# Patient Record
Sex: FEMALE | Race: WHITE | Employment: FULL TIME | URBAN - METROPOLITAN AREA
[De-identification: names, ages, dates, MRNs, and addresses within clinical notes are randomized per-mention and may not be internally consistent; named-entity substitution may affect disease eponyms.]

---

## 2018-11-12 ENCOUNTER — HOSPITAL ENCOUNTER (OUTPATIENT)
Dept: PHYSICAL THERAPY | Age: 38
Discharge: HOME | End: 2018-11-12
Payer: COMMERCIAL

## 2018-11-12 PROCEDURE — 97162 PT EVAL MOD COMPLEX 30 MIN: CPT

## 2018-11-12 PROCEDURE — 97110 THERAPEUTIC EXERCISES: CPT

## 2018-11-12 NOTE — PROGRESS NOTES
PHYSICAL THERAPY - DAILY TREATMENT NOTE Patient Name: Wenceslao Lin        Date: 2018 : 1980   YES Patient  Verified Visit #:     Insurance: Payor: Tonio Banuelos / Plan: [facility] PPO / Product Type: Commerical / In time: 335 Out time: 430 Total Treatment Time: 54 Medicare/BCBS Time Tracking (below) Total Timed Codes (min):   1:1 Treatment Time:    
TREATMENT AREA =  Right ankle pain [M25.571] SUBJECTIVE Pain Level (on 0 to 10 scale):  4  / 10 Medication Changes/New allergies or changes in medical history, any new surgeries or procedures? NO    If yes, update Summary List  
Subjective Functional Status/Changes:  []  No changes reported See POC OBJECTIVE Modalities Rationale:     decrease inflammation, decrease pain and increase tissue extensibility to improve patient's ability to perform functional ADLs 
 min [] Estim, type/location:   
                                 []  att     []  unatt     []  w/US     []  w/ice    []  w/heat 
 min []  Mechanical Traction: type/lbs   
               []  pro   []  sup   []  int   []  cont    []  before manual    []  after manual  
 min []  Ultrasound, settings/location:    
 min []  Iontophoresis w/ dexamethasone, location:   
                                           []  take home patch       []  in clinic  
 min []  Ice     []  Heat    location/position:   
 min []  Vasopneumatic Device, press/temp:   
 min []  Other:   
[] Skin assessment post-treatment (if applicable):   
[]  intact    []  redness- no adverse reaction    
[]redness  adverse reaction:     
15 min Therapeutic Exercise:  [x]  See flow sheet Rationale:      increase ROM and increase strength to improve the patients ability to regain full functional mobility of R ankle for return to walking 
  
 min Manual Therapy: Technique:     
[] S/DTM []IASTM []PROM [] Passive Stretching []manual TPR  [] TDN (see objective; actual needle insertion time not billed) []Jt manipulation:Gr I [] II []  III [] IV[] V[] Treatment/Area:    
Rationale:      decrease pain, increase ROM, increase tissue extensibility and decrease trigger points to improve patient's ability to  min Therapeutic Activity:   
Rationale:    increase strength, improve coordination and increase proprioception to improve the patients ability to  
 min Neuromuscular Re-ed:   
Rationale:    improve coordination, improve balance and increase proprioception to improve the patients ability to  
 min Gait Training:   
Rationale:     min Patient Education:  YES  Reviewed HEP [x]  Progressed/Changed HEP based on:   Issued written HEP and reviewed Other Objective/Functional Measures: 
 
See POC 
TE per FS Post Treatment Pain Level (on 0 to 10) scale:   4  / 10 ASSESSMENT Assessment/Changes in Function:  
 
Progressed treatment as appropriate with good patient tolerance 
  
[]  See Progress Note/Recertification Patient will continue to benefit from skilled PT services to modify and progress therapeutic interventions, address functional mobility deficits, address ROM deficits, address strength deficits, analyze and address soft tissue restrictions, analyze and cue movement patterns, analyze and modify body mechanics/ergonomics and assess and modify postural abnormalities to attain remaining goals. Progress toward goals / Updated goals: 
Progressing towards goals established in POC PLAN [x]  Upgrade activities as tolerated YES Continue plan of care  
[]  Discharge due to :   
[]  Other:   
 
Therapist: Leon Fitzpatrick PT, DPT, MTC, CMTPT Date: 11/12/2018 Time: 6:45 PM  
 
Future Appointments Date Time Provider Christian Carver 11/16/2018 11:30 AM Aquilino Dumont PT [facility]  
11/20/2018  9:00 AM Aquilino Dumont PT [facility]  
11/23/2018 11:00 AM [facility]  
 11/27/2018  9:00 AM Danilo Cantu PT [place] [id number]  
11/30/2018 10:00 AM Belkys Lacey [place] [address]  
12/4/2018  9:30 AM Danilo Cantu, PT [place] [address]  
12/7/2018 10:00 AM Lorjoe Lacey [place] [address]  
12/11/2018  9:30 AM Danilo Cantu PT [place] [id number]  
12/14/2018 10:00 AM Belkys Lacey [place] [address]  
12/18/2018  9:30 AM Danilo Cantu PT [place] [address]  
12/21/2018 10:00 AM Belkys Lacey [place] [address]  
12/26/2018  2:30 PM Danilo Cantu, PT [place] [address]  
12/28/2018  9:30 AM Danilo Cantu PT [place] [address]  
1/2/2019  9:30 AM Danilo Cantu PT [place] [address]  
1/4/2019  9:30 AM Danilo Cantu PT [place] [address]  
1/8/2019  9:30 AM Danilo Cantu PT [place] [address]  
1/11/2019 10:00 AM Laly Kumar [facility] [address]

## 2018-11-12 NOTE — PROGRESS NOTES
Bethany Metz [address] [facility] [place] [facility]  [facility] 
[facility] [place] [facility], [address] ,[address], [address]  Phone: [phone]  Fax: [phone] PLAN OF CARE / STATEMENT OF MEDICAL NECESSITY FOR PHYSICAL THERAPY SERVICES Patient Name: Ene Singh : 1980 Medical  
Diagnosis: Right ankle pain [M25.571] Treatment Diagnosis: R ankle pain Onset Date: 18 Referral Source: Sara Connors MD [facility]): 2018 Prior Hospitalization: See medical history Provider #: [id number] Prior Level of Function: Rock climbing Comorbidities: RA, R tib/fib non-union FX Medications: Verified on Patient Summary List  
The Plan of Care and following information is based on the information from the initial evaluation.  
=========================================================================================== Assessment / key information: Patient is a 40 y.o. female who presents to [facility] S/P R post tib T repair on 18. Patient reports she was rocking climbing on 9/15/17 and she jumped down onto pad from [address] [place] [address] Place, resulting in R tib/fib FX. She has emergency SX for FX reduction and placement of external fixator, and ORIF 10/2/17. She developed infection along medial lower leg which was irrigated in 2017, tibial hardware removed 2017 and received pic line antibiotics. She received bone marrow inj due to non-union FX, cleared to WB in limited hinged AFO March-2018. Pain persisted and fibula hardware was removed on 2018 in addition to repair of post tib T longitudinal tear. Currently she has been WBAT in hinged AFO for 1 week using FWW. Objective PT examination findings include (1) dec R ankle AROM DF -4°, PF 40°, INV 10° p!, EV 12°, GT ext 28°(2) swelling (3) strength grossly 3+/5, PF/INV TBA (4) WB tolerance approx 50%.  A home exercise program was demonstrated and provided to address the above objective and functional deficits. Patient can benefit from skilled PT interventions to improve ROM/strenght, decrease swelling, to facilitate performance of ADLs & improve overall functional status. =========================================================================================== Eval Complexity: History MEDIUM  Complexity : 1-2 comorbidities / personal factors will impact the outcome/ POC ;  Examination  MEDIUM Complexity : 3 Standardized tests and measures addressing body structure, function, activity limitation and / or participation in recreation ; Presentation MEDIUM Complexity : Evolving with changing characteristics ; Decision Making MEDIUM Complexity : FOTO score of 26-74; Overall Complexity MEDIUM Problem List: pain affecting function, decrease ROM, decrease strength, edema affecting function, impaired gait/ balance, decrease ADL/ functional abilitiies, decrease activity tolerance, decrease flexibility/ joint mobility and decrease transfer abilities, FOTO score 32 Treatment Plan may include any combination of the following: Therapeutic exercise, Therapeutic activities, Neuromuscular re-education, Physical agent/modality, Gait/balance training, Manual therapy including dry needling, Aquatic therapy and Patient education Patient / Family readiness to learn indicated by: asking questions, trying to perform skills and interestPersons(s) to be included in education: patient (P) Barriers to Learning/Limitations: no 
Measures taken:   
Patient Goal (s):\"re-establish some level of normalcy: flexibility & full weightbearing\" Patient self reported health status: fair Rehabilitation Potential: good? Short Term Goals: To be accomplished in  1-2  weeks: 
1) Patient to be independent and compliant with initial HEP to prevent further disability. 2) Improve R ankle mobility by >5° in all planes so ROM is available for normal gait mechanics. 3) Patient to maintain SLS for >10sec with good ankle balance strategy indicating improved strength and WBing tolerance to walking without AD ? Long Term Goals: To be accomplished in  3-4  weeks: 
1) Patient to be independent & compliant with a progressive, high level HEP in order to maintain gains made in physical therapy. 2) Improve FOTO score to 50 indicating significant functional improvement in order to return to PLOF. 3) Improve gross R ankle strength to 4+/5 MMT so strength is available for ADLs. Frequency / Duration:   Patient to be seen  2-3  times per week for 3-4  weeks: 
Patient / Caregiver education and instruction: self care, activity modification and exercises G-Codes (GP): NA Therapist Signature: Pierce Kuo PT, DPT, MTC, CMTPT Date: 11/12/2018 Certification Period: NA Time: 3:39 PM  
=========================================================================================== I certify that the above Physical Therapy Services are being furnished while the patient is under my care. I agree with the treatment plan and certify that this therapy is necessary. Physician Signature:       Date:      Time:  Please sign and return to [facility] [place] or you may fax the signed copy to [phone]. Thank you.

## 2018-11-16 ENCOUNTER — HOSPITAL ENCOUNTER (OUTPATIENT)
Dept: PHYSICAL THERAPY | Age: 38
Discharge: HOME | End: 2018-11-16
Payer: COMMERCIAL

## 2018-11-16 PROCEDURE — 97110 THERAPEUTIC EXERCISES: CPT

## 2018-11-16 NOTE — PROGRESS NOTES
PHYSICAL THERAPY - DAILY TREATMENT NOTE Patient Name: Ivonne Awad        Date: 2018 : 1980   YES Patient  Verified Visit #:      of   12  Insurance: Payor: Radha Domingo / Plan: [facility] PPO / Product Type: Commerical / In time: 1130 Out time: 1220 Total Treatment Time: 45 Medicare/BCBS Time Tracking (below) Total Timed Codes (min):   1:1 Treatment Time:    
TREATMENT AREA =  Right ankle pain [M25.571] SUBJECTIVE Pain Level (on 0 to 10 scale):  4  / 10 Medication Changes/New allergies or changes in medical history, any new surgeries or procedures? NO    If yes, update Summary List  
Subjective Functional Status/Changes:  []  No changes reported Amarilis Buff been doing the HEP and have noticed my calf is starting to loosen up OBJECTIVE Modalities Rationale:     decrease inflammation, decrease pain and increase tissue extensibility to improve patient's ability to perform functional ADLs 
 min [] Estim, type/location:   
                                 []  att     []  unatt     []  w/US     []  w/ice    []  w/heat 
 min []  Mechanical Traction: type/lbs   
               []  pro   []  sup   []  int   []  cont    []  before manual    []  after manual  
 min []  Ultrasound, settings/location:    
 min []  Iontophoresis w/ dexamethasone, location:   
                                           []  take home patch       []  in clinic  
@ home min []  Ice     []  Heat    location/position:   
 min []  Vasopneumatic Device, press/temp:   
 min []  Other:   
[] Skin assessment post-treatment (if applicable):   
[]  intact    []  redness- no adverse reaction    
[]redness  adverse reaction:     
45 min Therapeutic Exercise:  [x]  See flow sheet Rationale:      increase ROM and increase strength to improve the patients ability to regain full functional mobility of R ankle for return to walking 
  
 min Manual Therapy: Technique: [] S/DTM []IASTM []PROM [] Passive Stretching  
[]manual TPR  [] TDN (see objective; actual needle insertion time not billed) []Jt manipulation:Gr I [] II []  III [] IV[] V[] Treatment/Area:    
Rationale:      decrease pain, increase ROM, increase tissue extensibility and decrease trigger points to improve patient's ability to  min Therapeutic Activity:   
Rationale:    increase strength, improve coordination and increase proprioception to improve the patients ability to  
 min Neuromuscular Re-ed:   
Rationale:    improve coordination, improve balance and increase proprioception to improve the patients ability to  
 min Gait Training:   
Rationale:     min Patient Education:  YES  Reviewed HEP [x]  Progressed/Changed HEP based on:   Issued written HEP and reviewed Other Objective/Functional Measures: 
 
TE per FS Added R BAPS/RB in stting, tband ankle 4way and tband FHL, WS Post Treatment Pain Level (on 0 to 10) scale:   3  / 10 ASSESSMENT Assessment/Changes in Function:  
 
Progressed treatment as appropriate with good patient tolerance. Difficulty with ecc control during tband exercises 
  
[]  See Progress Note/Recertification Patient will continue to benefit from skilled PT services to modify and progress therapeutic interventions, address functional mobility deficits, address ROM deficits, address strength deficits, analyze and address soft tissue restrictions, analyze and cue movement patterns, analyze and modify body mechanics/ergonomics and assess and modify postural abnormalities to attain remaining goals. Progress toward goals / Updated goals: 
Progressing towards STG1 PLAN [x]  Upgrade activities as tolerated YES Continue plan of care  
[]  Discharge due to :   
[]  Other:   
 
Therapist: Owen Cole, PT, DPT, MTC, CMTPT Date: 11/16/2018 Time: 6:45 PM  
 
Future Appointments Date Time Provider Christian Carver 11/20/2018  9:00 AM Danilo Cantu, PT [facility]  
 11/23/2018 11:00 AM [facility]  
11/27/2018  9:00 AM [place]amini Ovalles, [place] [facility]  
11/30/2018 10:00 AM [facility]  
12/4/2018  9:30 AM Yamini Ovalles, [place] [facility]  
12/7/2018 10:00 AM [facility]  
12/11/2018  9:30 AM Yamini Ovalles, PT [facility]  
12/14/2018 10:00 AM [facility]  
12/18/2018  9:30 AM Yamini Ovalles, PT [facility]  
12/21/2018 10:00 AM [facility]  
12/26/2018  2:30 PM Yamini Ovalles, PT [facility]  
12/28/2018  9:30 AM Yamini Ovalles, PT [facility]  
1/2/2019  9:30 AM Yamini Ovalles, PT [facility]  
1/4/2019  9:30 AM Yamini Ovalles, PT [facility]  
1/8/2019  9:30 AM Yamini Ovalles, PT [facility]  
1/11/2019 10:00 AM [facility]

## 2018-11-20 ENCOUNTER — HOSPITAL ENCOUNTER (OUTPATIENT)
Dept: PHYSICAL THERAPY | Age: 38
Discharge: HOME | End: 2018-11-20
Payer: COMMERCIAL

## 2018-11-20 PROCEDURE — 97110 THERAPEUTIC EXERCISES: CPT

## 2018-11-20 NOTE — PROGRESS NOTES
PHYSICAL THERAPY - DAILY TREATMENT NOTE Patient Name: Eliza Huynh        Date: 2018 : 1980   YES Patient  Verified Visit #:   3   of   12  Insurance: Payor: Prduencio Lenz / Plan: [employer] [facility] [employer] PPO / Product Type: Commerical / In time: 900 Out time: 1000 Total Treatment Time: 54 Medicare/BCBS Time Tracking (below) Total Timed Codes (min):   1:1 Treatment Time:    
TREATMENT AREA =  Right ankle pain [M25.571] SUBJECTIVE Pain Level (on 0 to 10 scale):  3  / 10 Medication Changes/New allergies or changes in medical history, any new surgeries or procedures? NO    If yes, update Summary List  
Subjective Functional Status/Changes:  []  No changes reported A little sore in the mm after LV but no more than I expected and evan been doing the exercises OBJECTIVE Modalities Rationale:     decrease inflammation, decrease pain and increase tissue extensibility to improve patient's ability to perform functional ADLs 
 min [] Estim, type/location:   
                                 []  att     []  unatt     []  w/US     []  w/ice    []  w/heat 
 min []  Mechanical Traction: type/lbs   
               []  pro   []  sup   []  int   []  cont    []  before manual    []  after manual  
 min []  Ultrasound, settings/location:    
 min []  Iontophoresis w/ dexamethasone, location:   
                                           []  take home patch       []  in clinic  
10 min [x]  Ice     []  Heat    location/position: R ankle, LE elevated  
 min []  Vasopneumatic Device, press/temp:   
 min []  Other:   
[] Skin assessment post-treatment (if applicable):   
[]  intact    []  redness- no adverse reaction    
[]redness  adverse reaction:     
45 min Therapeutic Exercise:  [x]  See flow sheet Rationale:      increase ROM and increase strength to improve the patients ability to regain full functional mobility of R ankle for return to walking min Manual Therapy: Technique:     
[] S/DTM []IASTM []PROM [] Passive Stretching  
[]manual TPR  [] TDN (see objective; actual needle insertion time not billed) []Jt manipulation:Gr I [] II []  III [] IV[] V[] Treatment/Area:    
Rationale:      decrease pain, increase ROM, increase tissue extensibility and decrease trigger points to improve patient's ability to  min Therapeutic Activity:   
Rationale:    increase strength, improve coordination and increase proprioception to improve the patients ability to  
 min Neuromuscular Re-ed:   
Rationale:    improve coordination, improve balance and increase proprioception to improve the patients ability to  
 min Gait Training:   
Rationale:     min Patient Education:  YES  Reviewed HEP [x]  Progressed/Changed HEP based on:   Issued written HEP and reviewed Other Objective/Functional Measures: 
 
TE per FS Added soleus stretch, tband post tib, std tandem and std HR Post Treatment Pain Level (on 0 to 10) scale:   3  / 10 ASSESSMENT Assessment/Changes in Function:  
 
Improving tolerance to FWB on R without brace, tolerance remains limited by nerve pain/hypersensitivity []  See Progress Note/Recertification Patient will continue to benefit from skilled PT services to modify and progress therapeutic interventions, address functional mobility deficits, address ROM deficits, address strength deficits, analyze and address soft tissue restrictions, analyze and cue movement patterns, analyze and modify body mechanics/ergonomics and assess and modify postural abnormalities to attain remaining goals. Progress toward goals / Updated goals: 
Progressing towards STG1, see above PLAN [x]  Upgrade activities as tolerated YES Continue plan of care  
[]  Discharge due to :   
[]  Other:   
 
Therapist: Ariel Gimenez, PT, DPT, MTC, CMTPT Date: 11/20/2018 Time: 6:45 PM  
 
Future Appointments Date Time Provider Christian Carver [facility]  
11/27/2018  9:00 AM Basilia Louis, PT [facility]  
11/30/2018 10:[facility]  
12/4/2018  9:30 AM Basilia Louis, PT [facility]  
12/7/2018 10:[facility]  
12/11/2018  9:30 AM Basilia Louis, PT [facility]  
[facility]  
12/18/2018  9:30 AM Basilia Louis, PT [facility]  
12/21/2018 10:00 AM [facility]  
12/26/2018  2:30 PM Basilia Louis PT [facility]  
12/28/2018  9:30 AM Basilia Louis, PT [facility]  
1/2/2019  9:30 AM Basilia Louis PT [facility]  
1/4/2019  9:30 AM Basilia Louis PT [facility]  
1/8/2019  9:30 AM Basilia Louis PT [facility]  
1/11/2019 10:00 AM Ava Myrick [facility]

## 2018-11-23 ENCOUNTER — HOSPITAL ENCOUNTER (OUTPATIENT)
Dept: PHYSICAL THERAPY | Age: 38
Discharge: HOME | End: 2018-11-23
Payer: COMMERCIAL

## 2018-11-23 PROCEDURE — 97110 THERAPEUTIC EXERCISES: CPT

## 2018-11-23 NOTE — PROGRESS NOTES
PHYSICAL THERAPY - DAILY TREATMENT NOTE Patient Name: Amanda Avendano        Date: 2018 : 1980   yes Patient  Verified Visit #:     Insurance: Payor: Lane Scott / Plan: [facility] PPO / Product Type: Commerical / In time: 11:00A Out time: 12:10P Total Treatment Time: 70 Medicare/ [facility] Time Tracking (below) Total Timed Codes (min):  NA 1:1 Treatment Time:  NA  
TREATMENT AREA =  Right ankle pain [M25.571] SUBJECTIVE Pain Level (on 0 to 10 scale):  3  / 10 Medication Changes/New allergies or changes in medical history, any new surgeries or procedures?    no  If yes, update Summary List  
Subjective Functional Status/Changes:  []  No changes reported \"I am very sore today, I did a lot of standing and moving around for thanksgiving my foot is even more swollen than it normally is. \" OBJECTIVE Modalities Rationale:     decrease edema, decrease inflammation and decrease pain to improve patient's ability to perform unlimited ambulation with AFO donned. min [] Estim, type/location:   
                                 []  att     []  unatt     []  w/US     []  w/ice    []  w/heat 
 min []  Mechanical Traction: type/lbs   
               []  pro   []  sup   []  int   []  cont    []  before manual    []  after manual  
 min []  Ultrasound, settings/location:    
 min []  Iontophoresis w/ dexamethasone, location:   
                                           []  take home patch       []  in clinic  
10 min [x]  Ice     []  Heat    location/position: R foot and ankle in elevation   
 min []  Vasopneumatic Device, press/temp:   
 min []  Other:   
[] Skin assessment post-treatment (if applicable):   
[]  intact    []  redness- no adverse reaction    
[]redness  adverse reaction:     
60 min Therapeutic Exercise:  [x]  See flow sheet Rationale:      increase ROM, increase strength, improve coordination, improve balance and increase proprioception to improve the patients ability to perform unlimited ADLs  
 min Patient Education:  yes  Reviewed HEP []  Progressed/Changed HEP based on: Other Objective/Functional Measures: 
 
Inc repetitions of RB and [employer] standing time for tandem balance Post Treatment Pain Level (on 0 to 10) scale:   3  / 10 ASSESSMENT Assessment/Changes in Function:  
 
Patient is making good progress with PT rx with improvements in eccentric control of the R ankle, she has quick fatigue during all standing therex with poor stability in tandem with R foot back. []  See Progress Note/Recertification Patient will continue to benefit from skilled PT services to modify and progress therapeutic interventions, address functional mobility deficits, address ROM deficits, address strength deficits, analyze and address soft tissue restrictions, analyze and cue movement patterns, analyze and modify body mechanics/ergonomics and address imbalance/dizziness to attain remaining goals. Progress toward goals / Updated goals: 
Progressing towards STG 2. PLAN [x]  Upgrade activities as tolerated yes Continue plan of care  
[]  Discharge due to :   
[]  Other:   
 
Therapist: Carolyne Paget PT, DPT Date: 11/23/2018 Time: 12:43 PM  
 
Future Appointments Date Time Provider Christian Carver 11/27/2018  9:00 AM Darcie Luther PT [facility]  
11/30/2018 10:00 AM [facility]  
12/4/2018  9:30 AM Darcie Luther PT [facility]  
12/7/2018 10:00 AM [facility]  
12/11/2018  9:30 AM Darcie Luther PT [facility]  
12/14/2018 10:00 AM [facility]  
12/18/2018  9:30 AM Darcie Luther PT [facility]  
12/21/2018 [facility]  
12/26/2018  2:30 PM Darcie Luther PT [facility]  
12/28/2018  9:30 AM Darcie Luther PT [facility]  
1/2/2019  9:30 AM Darcie Luther PT [facility]  
 1/4/2019  9:30 AM Marco A Mckeon, PT [facility]  
1/8/2019  9:30 AM Marco A Mckeon PT [facility]  
1/11/2019 10:00 AM [facility]

## 2018-11-27 ENCOUNTER — HOSPITAL ENCOUNTER (OUTPATIENT)
Dept: PHYSICAL THERAPY | Age: 38
Discharge: HOME | End: 2018-11-27
Payer: COMMERCIAL

## 2018-11-27 PROCEDURE — 97110 THERAPEUTIC EXERCISES: CPT

## 2018-11-27 NOTE — PROGRESS NOTES
PHYSICAL THERAPY - DAILY TREATMENT NOTE Patient Name: Angeline Piper        Date: 2018 : 1980   YES Patient  Verified Visit #:     Insurance: Payor: Anuja Knight / Plan: [facility] PPO / Product Type: Commerical / In time: 900 Out time: 1005 Total Treatment Time: 72 Medicare/[facility] Time Tracking (below) Total Timed Codes (min):   1:1 Treatment Time:    
TREATMENT AREA =  Right ankle pain [M25.571] SUBJECTIVE Pain Level (on 0 to 10 scale):  5  / 10 Medication Changes/New allergies or changes in medical history, any new surgeries or procedures? NO    If yes, update Summary List  
Subjective Functional Status/Changes:  []  No changes reported I have a lot of popping in my ankle at the first part of the HR but it doesn't really hurt OBJECTIVE Modalities Rationale:     decrease inflammation, decrease pain and increase tissue extensibility to improve patient's ability to perform functional ADLs 
 min [] Estim, type/location:   
                                 []  att     []  unatt     []  w/US     []  w/ice    []  w/heat 
 min []  Mechanical Traction: type/lbs   
               []  pro   []  sup   []  int   []  cont    []  before manual    []  after manual  
 min []  Ultrasound, settings/location:    
 min []  Iontophoresis w/ dexamethasone, location:   
                                           []  take home patch       []  in clinic  
10 min [x]  Ice     []  Heat    location/position: R ankle, LE elevated  
 min []  Vasopneumatic Device, press/temp:   
 min []  Other:   
[] Skin assessment post-treatment (if applicable):   
[]  intact    []  redness- no adverse reaction    
[]redness  adverse reaction:     
55 min Therapeutic Exercise:  [x]  See flow sheet Rationale:      increase ROM and increase strength to improve the patients ability to regain full functional mobility of R ankle for return to walking min Manual Therapy: Technique:     
[] S/DTM []IASTM []PROM [] Passive Stretching  
[]manual TPR  [] TDN (see objective; actual needle insertion time not billed) []Jt manipulation:Gr I [] II []  III [] IV[] V[] Treatment/Area:    
Rationale:      decrease pain, increase ROM, increase tissue extensibility and decrease trigger points to improve patient's ability to  min Therapeutic Activity:   
Rationale:    increase strength, improve coordination and increase proprioception to improve the patients ability to  
 min Neuromuscular Re-ed:   
Rationale:    improve coordination, improve balance and increase proprioception to improve the patients ability to  
 min Gait Training:   
Rationale:     min Patient Education:  YES  Reviewed HEP [x]  Progressed/Changed HEP based on:   Issued written HEP and reviewed Other Objective/Functional Measures: 
 
TE per FS Progressed gastroc/soleus stretch and RB ant/post in to [address], added seated post tib HR with ball between knees and G tband at ankles, stair negotiation without brace Post Treatment Pain Level (on 0 to 10) scale:   3  / 10 ASSESSMENT Assessment/Changes in Function:  
 
Improving ankle stability during both static and dynamic mvmts 
  
[]  See Progress Note/Recertification Patient will continue to benefit from skilled PT services to modify and progress therapeutic interventions, address functional mobility deficits, address ROM deficits, address strength deficits, analyze and address soft tissue restrictions, analyze and cue movement patterns, analyze and modify body mechanics/ergonomics and assess and modify postural abnormalities to attain remaining goals. Progress toward goals / Updated goals: 
Met STG1 PLAN [x]  Upgrade activities as tolerated YES Continue plan of care  
[]  Discharge due to :   
[]  Other:   
 
Therapist: Vik Chandler, PT, DPT, MTC, CMTPT Date: 11/27/2018 Time: 6:45 PM  
 
Future Appointments Date Time Provider Christian Bei 11/30/2018 10:00 AM [facility]  
12/4/2018  9:30 AM Jasper Exon, PT [facility]  
12/7/2018 10:00 AM [facility]  
12/11/2018  9:30 AM Jasper Exon, PT [facility]  
12/14/2018 10:00 AM [facility]  
12/18/2018  9:30 AM Jasper Exon, PT [facility]  
12/21/2018 10:00 AM [facility]  
12/26/2018  2:30 PM Jasper Exon, PT [facility]  
12/28/2018  9:30 AM Jasper Exon, PT [facility]  
1/2/2019  9:30 AM Jasper Exon, PT [facility]  
1/4/2019  9:30 AM Jasper Exon, PT [facility]  
1/8/2019  9:30 AM Jasper Exon, PT [facility]  
1/11/2019 10:00 AM [facility]

## 2018-11-30 ENCOUNTER — HOSPITAL ENCOUNTER (OUTPATIENT)
Dept: PHYSICAL THERAPY | Age: 38
Discharge: HOME | End: 2018-11-30
Payer: COMMERCIAL

## 2018-11-30 PROCEDURE — 97110 THERAPEUTIC EXERCISES: CPT

## 2018-11-30 NOTE — PROGRESS NOTES
PHYSICAL THERAPY - DAILY TREATMENT NOTE Patient Name: Wenceslao Lin        Date: 2018 : 1980   yes Patient  Verified Visit #:     Insurance: Payor: Tonio Banuelos / Plan: [facility] PPO / Product Type: Commerical / In time: 10:00A Out time: 11:05A Total Treatment Time: 72 Medicare/ [employer] Time Tracking (below) Total Timed Codes (min):  NA 1:1 Treatment Time:  NA  
TREATMENT AREA =  Right ankle pain [M25.571] SUBJECTIVE Pain Level (on 0 to 10 scale):  4  / 10 Medication Changes/New allergies or changes in medical history, any new surgeries or procedures?    no  If yes, update Summary List  
Subjective Functional Status/Changes:  []  No changes reported \"I am doing pretty good. My swelling was better this morning when I wok eup. I wore the compression sock throughout the night and that really helped. \" OBJECTIVE Modalities Rationale:     decrease inflammation and decrease pain to improve patient's ability to perform unlimited ADLS 
 min [] Estim, type/location:   
                                 []  att     []  unatt     []  w/US     []  w/ice    []  w/heat 
 min []  Mechanical Traction: type/lbs   
               []  pro   []  sup   []  int   []  cont    []  before manual    []  after manual  
 min []  Ultrasound, settings/location:    
 min []  Iontophoresis w/ dexamethasone, location:   
                                           []  take home patch       []  in clinic  
10 min [x]  Ice     []  Heat    location/position: R ankle in supine with elevation  
 min []  Vasopneumatic Device, press/temp:   
 min []  Other:   
[] Skin assessment post-treatment (if applicable):   
[]  intact    []  redness- no adverse reaction    
[]redness  adverse reaction:     
55 min Therapeutic Exercise:  [x]  See flow sheet Rationale:      increase ROM, increase strength, improve coordination, improve balance and increase proprioception to improve the patients ability to perform unlimited ADls   
 min Patient Education:  yes  Reviewed HEP []  Progressed/Changed HEP based on: Other Objective/Functional Measures: Added standing mini squats with focus on even weight distribution on BLE Performed lateral 4\" step downs Initiated rec bike. Post Treatment Pain Level (on 0 to 10) scale:   0  / 10 ASSESSMENT Assessment/Changes in Function:  
 
Patient is making good progress with PT rx with improvements in gait mechanics as well as single limb stability on the RLE. []  See Progress Note/Recertification Patient will continue to benefit from skilled PT services to modify and progress therapeutic interventions, address functional mobility deficits, address ROM deficits, address strength deficits, analyze and address soft tissue restrictions, analyze and cue movement patterns, analyze and modify body mechanics/ergonomics, assess and modify postural abnormalities, address imbalance/dizziness and instruct in home and community integration to attain remaining goals. Progress toward goals / Updated goals: 
Progressing towards STG 2. PLAN [x]  Upgrade activities as tolerated yes Continue plan of care  
[]  Discharge due to :   
[]  Other:   
 
Therapist: Sylvester Dickey PT, DPT Date: 11/30/2018 Time: 12:40 PM  
 
Future Appointments Date Time Provider Christian Carver 12/4/2018  9:30 AM Meme Gates PT [facility]  
12/[facility]/2018 [facility]  
12/11/2018  9:30 AM Meme Gates [facility]  
12/14/2018 10:00 AM [facility]  
12/18/2018  9:30 AM Meme Gates PT [facility]  
12/21/2018 10:00 [facility]  
12/26/2018  2:30 PM Meme Gates PT [facility]  
12/28/2018  9:30 AM Meme Gates PT [facility]  
1/2/2019  9:30 AM Meme Gates PT [facility]  
1/4/2019  9:30 AM Meme Gates PT [facility]  
 1/8/2019  9:30 AM Darcie Luther [place] [facility]  
1/11/2019 10:00 AM [facility]

## 2018-12-04 ENCOUNTER — HOSPITAL ENCOUNTER (OUTPATIENT)
Dept: PHYSICAL THERAPY | Age: 38
Discharge: HOME | End: 2018-12-04
Payer: COMMERCIAL

## 2018-12-04 PROCEDURE — 97110 THERAPEUTIC EXERCISES: CPT

## 2018-12-04 NOTE — PROGRESS NOTES
PHYSICAL THERAPY - DAILY TREATMENT NOTE Patient Name: Emil Mark        Date: 2018 : 1980   YES Patient  Verified Visit #:     Insurance: Payor: Sloane Tinocoor / Plan: [facility] PPO / Product Type: Commerical / In time: 900 Out time: 950 Total Treatment Time: 50 Medicare/[facility] Time Tracking (below) Total Timed Codes (min):   1:1 Treatment Time:    
TREATMENT AREA =  Right ankle pain [M25.571] SUBJECTIVE Pain Level (on 0 to 10 scale):  3  / 10 Medication Changes/New allergies or changes in medical history, any new surgeries or procedures? NO    If yes, update Summary List  
Subjective Functional Status/Changes:  []  No changes reported The sensitivity on the bottom of my foot is a little better. Lucia been practicing the stairs without my brace on but its on carpeted stairs OBJECTIVE Modalities Rationale:     decrease inflammation, decrease pain and increase tissue extensibility to improve patient's ability to perform functional ADLs 
 min [] Estim, type/location:   
                                 []  att     []  unatt     []  w/US     []  w/ice    []  w/heat 
 min []  Mechanical Traction: type/lbs   
               []  pro   []  sup   []  int   []  cont    []  before manual    []  after manual  
 min []  Ultrasound, settings/location:    
 min []  Iontophoresis w/ dexamethasone, location:   
                                           []  take home patch       []  in clinic  
10 min [x]  Ice     []  Heat    location/position: R ankle, LE elevated  
 min []  Vasopneumatic Device, press/temp:   
 min []  Other:   
[] Skin assessment post-treatment (if applicable):   
[]  intact    []  redness- no adverse reaction    
[]redness  adverse reaction:     
40 min Therapeutic Exercise:  [x]  See flow sheet Rationale:      increase ROM and increase strength to improve the patients ability to regain full functional mobility of R ankle for return to walking 
  
 min Manual Therapy: Technique:     
[] S/DTM []IASTM []PROM [] Passive Stretching  
[]manual TPR  [] TDN (see objective; actual needle insertion time not billed) []Jt manipulation:Gr I [] II []  III [] IV[] V[] Treatment/Area:    
Rationale:      decrease pain, increase ROM, increase tissue extensibility and decrease trigger points to improve patient's ability to  min Therapeutic Activity:   
Rationale:    increase strength, improve coordination and increase proprioception to improve the patients ability to  
 min Neuromuscular Re-ed:   
Rationale:    improve coordination, improve balance and increase proprioception to improve the patients ability to  
 min Gait Training:   
Rationale:     min Patient Education:  YES  Reviewed HEP []  Progressed/Changed HEP based on: Other Objective/Functional Measures: 
 
TE per FS Progressed to G disc during SLS and RB ant/post with R and med/lat B in standing Post Treatment Pain Level (on 0 to 10) scale:   2  / 10 ASSESSMENT Assessment/Changes in Function:  
 
Improving gait mechanics without brace. Ankle DF improving, allowing for inc step length. Ankle balance strategy steadily improving 
  
[]  See Progress Note/Recertification Patient will continue to benefit from skilled PT services to modify and progress therapeutic interventions, address functional mobility deficits, address ROM deficits, address strength deficits, analyze and address soft tissue restrictions, analyze and cue movement patterns, analyze and modify body mechanics/ergonomics and assess and modify postural abnormalities to attain remaining goals. Progress toward goals / Updated goals: 
Met STG1, steady progress towards LTG1 PLAN [x]  Upgrade activities as tolerated YES Continue plan of care  
[]  Discharge due to :   
[]  Other:   
 
Therapist: Giulia Knox, PT, DPT, MTC, CMTPT Date: 12/4/2018 Time: 6:45 PM  
 
Future Appointments Date Time Provider Christian Bei 12/7/2018 10:00 AM Zohaibliz Blanca [facility]  
12/11/2018  9:30 AM Adry Perales PT [facility]  
12/14/2018 10:00 AM Zohaib Blanca [facility]  
12/18/2018  9:30 AM Adry Perales PT [facility]  
12/21/2018 10:00 AM Zohaib Blanca [facility]  
12/26/2018  2:30 PM Adry Perales PT [facility]  
12/28/2018  9:30 AM Adry Perales PT [facility]  
1/2/2019  9:30 AM Adry Perales PT [facility]  
1/4/2019  9:30 AM Adry Perales PT [facility]  
1/8/2019  9:30 AM Adry Perales PT [facility]  
1/11/2019 10:00 AM [facility]

## 2018-12-11 ENCOUNTER — HOSPITAL ENCOUNTER (OUTPATIENT)
Dept: PHYSICAL THERAPY | Age: 38
Discharge: HOME | End: 2018-12-11
Payer: COMMERCIAL

## 2018-12-11 PROCEDURE — 97110 THERAPEUTIC EXERCISES: CPT

## 2018-12-11 NOTE — PROGRESS NOTES
PHYSICAL THERAPY - DAILY TREATMENT NOTE    Patient Name: Michael Jeter        Date: 2018  : 1980   YES Patient  Verified  Visit #:     Insurance: Payor: Amy Tijerina / Plan: [employer] PPO / Product Type: Commerical /      In time: 930 Out time: 2314   Total Treatment Time: 65     Medicare/[facility] Time Tracking (below)   Total Timed Codes (min):   1:1 Treatment Time:       TREATMENT AREA =  Right ankle pain [M25.571]  SUBJECTIVE  Pain Level (on 0 to 10 scale):  3  / 10   Medication Changes/New allergies or changes in medical history, any new surgeries or procedures?     NO    If yes, update Summary List   Subjective Functional Status/Changes:  []  No changes reported     See PN       OBJECTIVE  Modalities Rationale:     decrease inflammation, decrease pain and increase tissue extensibility to improve patient's ability to perform functional ADLs   min [] Estim, type/location:                                      []  att     []  unatt     []  w/US     []  w/ice    []  w/heat    min []  Mechanical Traction: type/lbs                   []  pro   []  sup   []  int   []  cont    []  before manual    []  after manual    min []  Ultrasound, settings/location:      min []  Iontophoresis w/ dexamethasone, location:                                               []  take home patch       []  in clinic   10 min [x]  Ice     []  Heat    location/position: R ankle, LE elevated    min []  Vasopneumatic Device, press/temp:     min []  Other:    [] Skin assessment post-treatment (if applicable):    []  intact    []  redness- no adverse reaction     []redness  adverse reaction:        55 min Therapeutic Exercise:  [x]  See flow sheet   Rationale:      increase ROM and increase strength to improve the patients ability to regain full functional mobility of R ankle for return to walking      min Manual Therapy: Technique:      [] S/DTM []IASTM []PROM [] Passive Stretching   []manual TPR  [] TDN (see objective; actual needle insertion time not billed)  []Jt manipulation:Gr I [] II []  III [] IV[] V[]  Treatment/Area:     Rationale:      decrease pain, increase ROM, increase tissue extensibility and decrease trigger points to improve patient's ability to      min Therapeutic Activity:    Rationale:    increase strength, improve coordination and increase proprioception to improve the patients ability to      min Neuromuscular Re-ed:    Rationale:    improve coordination, improve balance and increase proprioception to improve the patients ability to      min Gait Training:    Rationale:       min Patient Education:  YES  Reviewed HEP   []  Progressed/Changed HEP based on: Other Objective/Functional Measures:    TE per FS  FOTO 51  AROM DF 5, INV 24, EV 12, GT 38  SLS 30 sec   Post Treatment Pain Level (on 0 to 10) scale:   2  / 10     ASSESSMENT  Assessment/Changes in Function:     See PN     [x]  See Progress Note/Recertification   Patient will continue to benefit from skilled PT services to modify and progress therapeutic interventions, address functional mobility deficits, address ROM deficits, address strength deficits, analyze and address soft tissue restrictions, analyze and cue movement patterns, analyze and modify body mechanics/ergonomics and assess and modify postural abnormalities to attain remaining goals.    Progress toward goals / Updated goals:    See PN     PLAN  [x]  Upgrade activities as tolerated YES Continue plan of care   []  Discharge due to :    []  Other:      Therapist: Shelley Parekh, PT, DPT, MTC, CMTPT    Date: 12/11/2018 Time: 6:45 PM     Future Appointments   Date Time Provider Christian Carver   12/14/2018 10:00 AM [place] [id number]   12/18/2018  9:30 AM Pam Jean PT [place] [address]   12/21/2018 10:00 AM [place] [address]   12/26/2018  2:30 PM Pam Jean PT [place] [address]   12/28/2018  9:30 AM Pam Jean PT [address] 1/2/2019  9:30 AM Teena Sierra, PT [facility]   1/4/2019  9:30 AM Teena Sierra, PT [facility]   1/8/2019  9:30 AM Teena Sierra, PT [facility]   1/11/2019 10:00 AM [facility]

## 2018-12-14 ENCOUNTER — HOSPITAL ENCOUNTER (OUTPATIENT)
Dept: PHYSICAL THERAPY | Age: 38
Discharge: HOME | End: 2018-12-14
Payer: COMMERCIAL

## 2018-12-14 PROCEDURE — 97110 THERAPEUTIC EXERCISES: CPT

## 2018-12-14 NOTE — PROGRESS NOTES
PHYSICAL THERAPY - DAILY TREATMENT NOTE    Patient Name: Navid Bass        Date: 2018  : 1980   yes Patient  Verified  Visit #:     Insurance: Payor: Tiffani Tong / Plan: [employer] PPO / Product Type: Commerical /      In time: 10:00A Out time: 11:05A   Total Treatment Time: 72     Medicare/ [facility] Time Tracking (below)   Total Timed Codes (min):  NA 1:1 Treatment Time:  NA     TREATMENT AREA =  Right ankle pain [M25.571]    SUBJECTIVE  Pain Level (on 0 to 10 scale):  3  / 10   Medication Changes/New allergies or changes in medical history, any new surgeries or procedures?    no  If yes, update Summary List   Subjective Functional Status/Changes:  []  No changes reported     \"I felt good after last time, I am doing better overall. \"           OBJECTIVE  Modalities Rationale:     decrease inflammation and decrease pain to improve patient's ability to perform unlimited ambulation   min [] Estim, type/location:                                      []  att     []  unatt     []  w/US     []  w/ice    []  w/heat    min []  Mechanical Traction: type/lbs                   []  pro   []  sup   []  int   []  cont    []  before manual    []  after manual    min []  Ultrasound, settings/location:      min []  Iontophoresis w/ dexamethasone, location:                                               []  take home patch       []  in clinic   10 min [x]  Ice     []  Heat    location/position: R ankle in supine with elevation     min []  Vasopneumatic Device, press/temp:     min []  Other:    [] Skin assessment post-treatment (if applicable):    []  intact    []  redness- no adverse reaction     []redness  adverse reaction:        55 min Therapeutic Exercise:  [x]  See flow sheet   Rationale:      increase ROM, increase strength, improve coordination, improve balance and increase proprioception to improve the patients ability to perform unlimited ADLs           min Patient Education:  yes  Reviewed HEP   []  Progressed/Changed HEP based on: Other Objective/Functional Measures:    Progressed step up to 6\" step with foam able to perform with minimal UE support. Post Treatment Pain Level (on 0 to 10) scale:   4  / 10     ASSESSMENT  Assessment/Changes in Function:     Patient is making good progress with PT rx with improvements in overall stability on the RLE. []  See Progress Note/Recertification   Patient will continue to benefit from skilled PT services to modify and progress therapeutic interventions, address functional mobility deficits, address ROM deficits, address strength deficits, analyze and address soft tissue restrictions, analyze and cue movement patterns, analyze and modify body mechanics/ergonomics, assess and modify postural abnormalities, address imbalance/dizziness and instruct in home and community integration to attain remaining goals. Progress toward goals / Updated goals:    Progressing towards LTG 2.      PLAN  [x]  Upgrade activities as tolerated yes Continue plan of care   []  Discharge due to :    []  Other:      Therapist: Sylvester Dickey PT, DPT    Date: 12/14/2018 Time: 10:57 AM     Future Appointments   Date Time Provider Christian Carver   12/18/2018  9:30 AM Meme Gates [facility]   12/21/2018 10:00 AM Meli Cain [facility]   12/26/2018  2:30 PM Meme Gates, [facility]   12/28/2018  9:30 AM Meme Gates, [facility]   1/2/2019  9:30 AM Meme Gates [facility]   1/4/2019  9:30 AM Meme Gates [facility]   1/8/2019  9:30 AM Meme Gates, [facility]   1/11/2019 10:00 AM [facility]

## 2018-12-18 ENCOUNTER — HOSPITAL ENCOUNTER (OUTPATIENT)
Dept: PHYSICAL THERAPY | Age: 38
Discharge: HOME | End: 2018-12-18
Payer: COMMERCIAL

## 2018-12-18 PROCEDURE — 97110 THERAPEUTIC EXERCISES: CPT

## 2018-12-18 NOTE — PROGRESS NOTES
PHYSICAL THERAPY - DAILY TREATMENT NOTE    Patient Name: Angeline Piper        Date: 2018  : 1980   YES Patient  Verified  Visit #:   10   of   12  Insurance: Payor: Anuja Knight / Plan: Chyna Cnao 77 PPO / Product Type: Commerical /      In time: 930 Out time: 1030   Total Treatment Time: 55     Medicare/BCBS Time Tracking (below)   Total Timed Codes (min):   1:1 Treatment Time:       TREATMENT AREA =  Right ankle pain [M25.571]  SUBJECTIVE  Pain Level (on 0 to 10 scale):  3  / 10   Medication Changes/New allergies or changes in medical history, any new surgeries or procedures?     NO    If yes, update Summary List   Subjective Functional Status/Changes:  []  No changes reported     Still have trouble keeping my arch up and supported the first few steps       OBJECTIVE  Modalities Rationale:     decrease inflammation, decrease pain and increase tissue extensibility to improve patient's ability to perform functional ADLs   min [] Estim, type/location:                                      []  att     []  unatt     []  w/US     []  w/ice    []  w/heat    min []  Mechanical Traction: type/lbs                   []  pro   []  sup   []  int   []  cont    []  before manual    []  after manual    min []  Ultrasound, settings/location:      min []  Iontophoresis w/ dexamethasone, location:                                               []  take home patch       []  in clinic   10 min [x]  Ice     []  Heat    location/position: R ankle, LE elevated    min []  Vasopneumatic Device, press/temp:     min []  Other:    [] Skin assessment post-treatment (if applicable):    []  intact    []  redness- no adverse reaction     []redness  adverse reaction:        45 min Therapeutic Exercise:  [x]  See flow sheet   Rationale:      increase ROM and increase strength to improve the patients ability to regain full functional mobility of R ankle for return to walking      min Manual Therapy: Technique:      [] S/DTM []IASTM []PROM [] Passive Stretching   []manual TPR  [] TDN (see objective; actual needle insertion time not billed)  []Jt manipulation:Gr I [] II []  III [] IV[] V[]  Treatment/Area:     Rationale:      decrease pain, increase ROM, increase tissue extensibility and decrease trigger points to improve patient's ability to      min Therapeutic Activity:    Rationale:    increase strength, improve coordination and increase proprioception to improve the patients ability to      min Neuromuscular Re-ed:    Rationale:    improve coordination, improve balance and increase proprioception to improve the patients ability to      min Gait Training:    Rationale:       min Patient Education:  YES  Reviewed HEP   []  Progressed/Changed HEP based on: Other Objective/Functional Measures:    TE per FS  Fair balance strategy during SLS and SR with EC, noting \"arch\" fatigue   Post Treatment Pain Level (on 0 to 10) scale:   5  / 10     ASSESSMENT  Assessment/Changes in Function:     Slow improvements in ecc post tib strength, requiring cues to prevent rearfoot/midfoot pronation during gait barefoot and in shoe without brace     []  See Progress Note/Recertification   Patient will continue to benefit from skilled PT services to modify and progress therapeutic interventions, address functional mobility deficits, address ROM deficits, address strength deficits, analyze and address soft tissue restrictions, analyze and cue movement patterns, analyze and modify body mechanics/ergonomics and assess and modify postural abnormalities to attain remaining goals.    Progress toward goals / Updated goals:    Progressing towards newly established goals in PN     PLAN  [x]  Upgrade activities as tolerated YES Continue plan of care   []  Discharge due to :    []  Other:      Therapist: Jes Limon PT, DPT, MTC, CMTPT    Date: 12/18/2018 Time: 6:45 PM     Future Appointments   Date Time Provider Christian Carver 12/21/2018 10:00 AM [facility]   12/26/2018  2:30 PM Charanjit Anna, [place] [facility]   12/28/2018  9:30 AM Charanjit Anna, [place] [facility]   1/2/2019  9:30 AM Charanjit Anna, [place] [facility]   1/4/2019  9:30 AM Charanjit Anna, [place] [facility]   1/8/2019  9:30 AM Charanjit Anna, [place] [facility]   1/11/2019 10:00 [facility]

## 2018-12-18 NOTE — PROGRESS NOTES
[address]   PHYSICAL THERAPY  Shukri Christianson [address], [address], [facility] [facility] [address] -   Phone: [phone]  Fax: [phone]  [x]  PROGRESS NOTE  []  DISCHARGE SUMMARY  Patient Name: Rene Contreras : 1980   Treatment Diagnosis: Right ankle pain [M25.571]     Referral Source: Emmanuelle Wesley MD     Date of Initial Visit: 18 Attended Visits: 8 Missed Visits: 0     SUMMARY OF TREATMENT  Therapeutic exercises including ROM, strengthening and stretching; manual therapy including joint and soft tissue manipulation; gait and balance training, postural re-education, modalities: CP, HEP instruction. CURRENT STATUS  Patient is a 40 y.o. female who presents to [facility] S/P R post tib T repair on 18. Mrs. Josiah Gold has made steady progress in PT, demonstrating consistent improvements in ankle ROM, strength and stability. She is ambulating short distances at home and in the clinic without her brace, requiring fewer cues to increase step length to improve DF at terminal stance, inc stance time on R and normalize mechanics. Goal/Measure of Progress Goal Met? 1. Patient to be independent and compliant with initial HEP to prevent further disability. Status at last Eval: Established Current Status: I with HEP yes   2. Improve R ankle mobility by >5° in all planes so ROM is available for normal gait mechanics. Status at last Eval: DF -4°  PF 40°  INV 10° p! EV 12°  GT ext 28° Current Status: DF 5°   INV 24°  EV 12°  GT ext 38° Yes, except EV   3. Patient to maintain SLS for >10sec with good ankle balance strategy indicating improved strength and WBing tolerance to walking without AD    Status at last Eval: Unable to asume Current Status: 30 sec on level surface, fatigued yes   4. Improve FOTO score to 50 indicating significant functional improvement in order to return to PLOF.    Status at last Eval: 31 Current Status: 51 yes New Goals to be achieved in __3-4__  Weeks:  1. Patient to be independent & compliant with a progressive, high level HEP in order to maintain gains made in physical therapy. 2.  Improve FOTO score to 60 indicating significant functional improvement in order to return to PLOF. 3.  Improve gross R ankle strength to 4+/5 MMT so strength is available for ambulation in regular shoe on even terrain. G-Codes (GP): NA  RECOMMENDATIONS  Continue therapy per initial plan/protocol    Specifics: The patient could benefit from continued PT 2-3x/week for 3-4 weeks to progress towards achieving all LTGs. If you have any questions/comments please contact us directly at [phone]. Thank you for allowing us to assist in the care of your patient. Therapist Signature: Pierce Kuo, PT, DPT, MTC, CMTPT Date: 12/11/18   Reporting Period:  Time: 8:15 AM   NOTE TO PHYSICIAN:  PLEASE COMPLETE THE ORDERS BELOW AND FAX TO   [facility]: [phone]  If you are unable to process this request in 24 hours please contact our office: [phone]    ___ I have read the above report and request that my patient continue as recommended.   ___ I have read the above report and request that my patient continue therapy with the following changes/special instructions:_________________________________________________________   ___ I have read the above report and request that my patient be discharged from therapy.      Physician Signature:        Date:       Time:

## 2018-12-21 ENCOUNTER — HOSPITAL ENCOUNTER (OUTPATIENT)
Dept: PHYSICAL THERAPY | Age: 38
Discharge: HOME | End: 2018-12-21
Payer: COMMERCIAL

## 2018-12-21 PROCEDURE — 97110 THERAPEUTIC EXERCISES: CPT

## 2018-12-21 NOTE — PROGRESS NOTES
PHYSICAL THERAPY - DAILY TREATMENT NOTE    Patient Name: Judie Corado        Date: 2018  : 1980   yes Patient  Verified  Visit #:     Insurance: Payor: Kathi Jeffries / Plan: Chyna Cano 77 PPO / Product Type: Commerical /      In time: 10:00A Out time: 11:00A   Total Treatment Time: 60     Medicare/ [facility] Time Tracking (below)   Total Timed Codes (min):  NQ 1:1 Treatment Time:  NA     TREATMENT AREA =  Right ankle pain [M25.571]    SUBJECTIVE  Pain Level (on 0 to 10 scale):  4  / 10   Medication Changes/New allergies or changes in medical history, any new surgeries or procedures?    no  If yes, update Summary List   Subjective Functional Status/Changes:  []  No changes reported     \"I am doing pretty good, I got a new brace from doctor at follow up and my ankle is actually healing! \"      Pt reports wearing new brace only in the home.       OBJECTIVE  Modalities Rationale:     decrease inflammation and decrease pain to improve patient's ability to perform pain free ambulation    min [] Estim, type/location:                                      []  att     []  unatt     []  w/US     []  w/ice    []  w/heat    min []  Mechanical Traction: type/lbs                   []  pro   []  sup   []  int   []  cont    []  before manual    []  after manual    min []  Ultrasound, settings/location:      min []  Iontophoresis w/ dexamethasone, location:                                               []  take home patch       []  in clinic   10 min [x]  Ice     []  Heat    location/position: R ankle in long sit.     min []  Vasopneumatic Device, press/temp:     min []  Other:    [] Skin assessment post-treatment (if applicable):    []  intact    []  redness- no adverse reaction     []redness  adverse reaction:        50 min Therapeutic Exercise:  [x]  See flow sheet   Rationale:      increase ROM, increase strength, improve coordination, improve balance and increase proprioception to improve the patients ability to perform unlimited ADL s          min Patient Education:  yes  Reviewed HEP   []  Progressed/Changed HEP based on: Other Objective/Functional Measures: Add lateral steps in parallel bars and lateral step up.  performed SLS on blue disc with EO for 20\" x 3   Post Treatment Pain Level (on 0 to 10) scale:   4 / 10     ASSESSMENT  Assessment/Changes in Function:     Patient is making good progress with PT rx with good tolerance to therex with no increase in pain. []  See Progress Note/Recertification   Patient will continue to benefit from skilled PT services to modify and progress therapeutic interventions, address functional mobility deficits, address ROM deficits, address strength deficits, analyze and address soft tissue restrictions, analyze and cue movement patterns, analyze and modify body mechanics/ergonomics, assess and modify postural abnormalities, address imbalance/dizziness and instruct in home and community integration to attain remaining goals. Progress toward goals / Updated goals:    Progressing towards LTG 2.       PLAN  [x]  Upgrade activities as tolerated yes Continue plan of care   []  Discharge due to :    []  Other:      Therapist: Jesi Bedoya PT, DPT    Date: 12/21/2018 Time: 12:30 PM     Future Appointments   Date Time Provider Christian Carver   12/26/2018  2:30 PM Renea Luther PT [facility]   12/28/2018  9:30 AM Renea Luther PT [facility]   1/2/2019  9:30 AM Renea Luther PT [facility]   1/4/2019  9:30 AM Renea Luther PT [facility]   1/8/2019  9:30 AM Renea Luther PT [facility]   1/11/2019 10:00 AM [facility]

## 2018-12-26 ENCOUNTER — HOSPITAL ENCOUNTER (OUTPATIENT)
Dept: PHYSICAL THERAPY | Age: 38
Discharge: HOME | End: 2018-12-26
Payer: COMMERCIAL

## 2018-12-26 PROCEDURE — 97110 THERAPEUTIC EXERCISES: CPT

## 2018-12-26 NOTE — PROGRESS NOTES
PHYSICAL THERAPY - DAILY TREATMENT NOTE    Patient Name: Clarke Lopez        Date: 2018  : 1980   YES Patient  Verified  Visit #:     Insurance: Payor: Ruth Mask / Plan: [employer] Tai[employer] Matos 77 PPO / Product Type: Commerical /      In time: 230 Out time: 345   Total Treatment Time: 70     Medicare/[facility] Time Tracking (below)   Total Timed Codes (min):   1:1 Treatment Time:       TREATMENT AREA =  Right ankle pain [M25.571]  SUBJECTIVE  Pain Level (on 0 to 10 scale):  4  / 10   Medication Changes/New allergies or changes in medical history, any new surgeries or procedures?     NO    If yes, update Summary List   Subjective Functional Status/Changes:  []  No changes reported     Want to join the gym the 2- week of  but get my legs ready       OBJECTIVE  Modalities Rationale:     decrease inflammation, decrease pain and increase tissue extensibility to improve patient's ability to perform functional ADLs   min [] Estim, type/location:                                      []  att     []  unatt     []  w/US     []  w/ice    []  w/heat    min []  Mechanical Traction: type/lbs                   []  pro   []  sup   []  int   []  cont    []  before manual    []  after manual    min []  Ultrasound, settings/location:      min []  Iontophoresis w/ dexamethasone, location:                                               []  take home patch       []  in clinic   10 min [x]  Ice     []  Heat    location/position: R ankle in supine    min []  Vasopneumatic Device, press/temp:     min []  Other:    [] Skin assessment post-treatment (if applicable):    []  intact    []  redness- no adverse reaction     []redness  adverse reaction:        60 min Therapeutic Exercise:  [x]  See flow sheet   Rationale:      increase ROM and increase strength to improve the patients ability to regain full functional mobility of R ankle for return to walking and daily workouts      min Manual Therapy: Technique:      [] S/DTM []IASTM []PROM [] Passive Stretching   []manual TPR  [] TDN (see objective; actual needle insertion time not billed)  []Jt manipulation:Gr I [] II []  III [] IV[] V[]  Treatment/Area:     Rationale:      decrease pain, increase ROM, increase tissue extensibility and decrease trigger points to improve patient's ability to      min Therapeutic Activity:    Rationale:    increase strength, improve coordination and increase proprioception to improve the patients ability to      min Neuromuscular Re-ed:    Rationale:    improve coordination, improve balance and increase proprioception to improve the patients ability to      min Gait Training:    Rationale:       min Patient Education:  YES  Reviewed HEP   []  Progressed/Changed HEP based on: Other Objective/Functional Measures:    TE per FS  Added fwd and lat step over 4\" and 12\" hurdles, HR off step and R TG squat  Progressed to 6\" lat step down   Post Treatment Pain Level (on 0 to 10) scale:   4  / 10     ASSESSMENT  Assessment/Changes in Function:     Min cues for inc post tib recruitment during hurdles. Improving dynamic ankle stability     []  See Progress Note/Recertification   Patient will continue to benefit from skilled PT services to modify and progress therapeutic interventions, address functional mobility deficits, address ROM deficits, address strength deficits, analyze and address soft tissue restrictions, analyze and cue movement patterns, analyze and modify body mechanics/ergonomics and assess and modify postural abnormalities to attain remaining goals.    Progress toward goals / Updated goals:    Progressing towards [facility]  [x]  Upgrade activities as tolerated YES Continue plan of care   []  Discharge due to :    []  Other:      Therapist: Piter Greer PT, DPT, MTC, CMTPT    Date: 12/26/2018 Time: 6:45 PM     Future Appointments   Date Time Provider Christian Carver   12/28/2018  9:30 AM Cheryl Moreno PT [facility]   1/2/2019  9:30 AM Teena Clarity, PT [facility]   1/4/2019  9:30 AM Teena Clarity, PT [facility]   1/8/2019  9:30 AM Teena Clarity, PT [facility]   1/11/2019 10:00 AM [facility]

## 2019-01-02 ENCOUNTER — HOSPITAL ENCOUNTER (OUTPATIENT)
Dept: PHYSICAL THERAPY | Age: 39
Discharge: HOME | End: 2019-01-02
Payer: COMMERCIAL

## 2019-01-02 PROCEDURE — 97110 THERAPEUTIC EXERCISES: CPT

## 2019-01-02 NOTE — PROGRESS NOTES
PHYSICAL THERAPY - DAILY TREATMENT NOTE Patient Name: Shae Corral        Date: 2019 : 1980   YES Patient  Verified Visit #:   15   of   20  Insurance: Payor: Mary Ellen Last / Plan: Chyna Cano 77 PPO / Product Type: Commerical / In time: 935 Out time: 1754 Total Treatment Time: 72 Medicare/[facility] Time Tracking (below) Total Timed Codes (min):   1:1 Treatment Time:    
TREATMENT AREA =  Right ankle pain [M25.571] SUBJECTIVE Pain Level (on 0 to 10 scale):  2-3  / 10 Medication Changes/New allergies or changes in medical history, any new surgeries or procedures? NO    If yes, update Summary List  
Subjective Functional Status/Changes:  []  No changes reported I havent been wearing the aircast at home as much as I should be OBJECTIVE Modalities Rationale:     decrease inflammation, decrease pain and increase tissue extensibility to improve patient's ability to perform functional ADLs 
 min [] Estim, type/location:   
                                 []  att     []  unatt     []  w/US     []  w/ice    []  w/heat 
 min []  Mechanical Traction: type/lbs   
               []  pro   []  sup   []  int   []  cont    []  before manual    []  after manual  
 min []  Ultrasound, settings/location:    
 min []  Iontophoresis w/ dexamethasone, location:   
                                           []  take home patch       []  in clinic  
10 min [x]  Ice     []  Heat    location/position: R ankle in supine  
 min []  Vasopneumatic Device, press/temp:   
 min []  Other:   
[] Skin assessment post-treatment (if applicable):   
[]  intact    []  redness- no adverse reaction    
[]redness  adverse reaction:     
55 min Therapeutic Exercise:  [x]  See flow sheet Rationale:      increase ROM and increase strength to improve the patients ability to regain full functional mobility of R ankle for return to walking and daily workouts min Manual Therapy: Technique:     
[] S/DTM []IASTM []PROM [] Passive Stretching  
[]manual TPR  [] TDN (see objective; actual needle insertion time not billed) []Jt manipulation:Gr I [] II []  III [] IV[] V[] Treatment/Area:    
Rationale:      decrease pain, increase ROM, increase tissue extensibility and decrease trigger points to improve patient's ability to  min Therapeutic Activity:   
Rationale:    increase strength, improve coordination and increase proprioception to improve the patients ability to  
 min Neuromuscular Re-ed:   
Rationale:    improve coordination, improve balance and increase proprioception to improve the patients ability to  
 min Gait Training:   
Rationale:     min Patient Education:  YES  Reviewed HEP []  Progressed/Changed HEP based on: Other Objective/Functional Measures: 
 
TE per FS Added tandem walk bkwd and progressed to 12\" step up and monster walk Post Treatment Pain Level (on 0 to 10) scale:   2  / 10 ASSESSMENT Assessment/Changes in Function:  
 
Improving dynamic post tib strength and ability to maintain neutral arch when stepping 
  
[]  See Progress Note/Recertification Patient will continue to benefit from skilled PT services to modify and progress therapeutic interventions, address functional mobility deficits, address ROM deficits, address strength deficits, analyze and address soft tissue restrictions, analyze and cue movement patterns, analyze and modify body mechanics/ergonomics and assess and modify postural abnormalities to attain remaining goals. Progress toward goals / Updated goals: 
Progressing towards LTG3 PLAN [x]  Upgrade activities as tolerated YES Continue plan of care  
[]  Discharge due to :   
[]  Other:   
 
Therapist: Aleksandra Smith, PT, DPT, MTC, CMTPT Date: 1/2/2019 Time: 6:45 PM  
 
Future Appointments Date Time Provider Christian Carver 1/4/2019  9:30 AM Lorena Clement, PT [facility]  
 1/8/2019  9:30 AM [place]anca[place] [facility] [place]  
[facility]2019 [facility]

## 2019-01-04 ENCOUNTER — HOSPITAL ENCOUNTER (OUTPATIENT)
Dept: PHYSICAL THERAPY | Age: 39
Discharge: HOME | End: 2019-01-04
Payer: COMMERCIAL

## 2019-01-04 PROCEDURE — 97110 THERAPEUTIC EXERCISES: CPT

## 2019-01-04 NOTE — PROGRESS NOTES
PHYSICAL THERAPY - DAILY TREATMENT NOTE Patient Name: Yeimi Sarabia        Date: 2019 : 1980   YES Patient  Verified Visit #:   14      20  Insurance: Payor: Mounika Agarwal / Plan: [facility] PPO / Product Type: Commerical / In time: 930 Out time:  Total Treatment Time: 72 Medicare/[facility] Time Tracking (below) Total Timed Codes (min):   1:1 Treatment Time:    
TREATMENT AREA =  Right ankle pain [M25.571] SUBJECTIVE Pain Level (on 0 to 10 scale):  0  / 10 Medication Changes/New allergies or changes in medical history, any new surgeries or procedures? NO    If yes, update Summary List  
Subjective Functional Status/Changes:  []  No changes reported Home life stress has made my RA flare up and my toes/ankle are hurting more OBJECTIVE Modalities Rationale:     decrease inflammation, decrease pain and increase tissue extensibility to improve patient's ability to perform functional ADLs 
 min [] Estim, type/location:   
                                 []  att     []  unatt     []  w/US     []  w/ice    []  w/heat 
 min []  Mechanical Traction: type/lbs   
               []  pro   []  sup   []  int   []  cont    []  before manual    []  after manual  
 min []  Ultrasound, settings/location:    
 min []  Iontophoresis w/ dexamethasone, location:   
                                           []  take home patch       []  in clinic  
10 min [x]  Ice     []  Heat    location/position: R ankle in supine  
 min []  Vasopneumatic Device, press/temp:   
 min []  Other:   
[] Skin assessment post-treatment (if applicable):   
[]  intact    []  redness- no adverse reaction    
[]redness  adverse reaction:     
55 min Therapeutic Exercise:  [x]  See flow sheet Rationale:      increase ROM and increase strength to improve the patients ability to regain full functional mobility of R ankle for return to walking and daily workouts min Manual Therapy: Technique:     
[] S/DTM []IASTM []PROM [] Passive Stretching  
[]manual TPR  [] TDN (see objective; actual needle insertion time not billed) []Jt manipulation:Gr I [] II []  III [] IV[] V[] Treatment/Area:    
Rationale:      decrease pain, increase ROM, increase tissue extensibility and decrease trigger points to improve patient's ability to  min Therapeutic Activity:   
Rationale:    increase strength, improve coordination and increase proprioception to improve the patients ability to  
 min Neuromuscular Re-ed:   
Rationale:    improve coordination, improve balance and increase proprioception to improve the patients ability to  
 min Gait Training:   
Rationale:     min Patient Education:  YES  Reviewed HEP []  Progressed/Changed HEP based on: Other Objective/Functional Measures: 
 
TE per FS Added hip 3 way standing on blue disc and bosu squat Trial of HR ecc lowering on R only, unable to tolerate due to ankle pain Post Treatment Pain Level (on 0 to 10) scale:   0  / 10 ASSESSMENT Assessment/Changes in Function:  
 
Progressed treatment as appropriate with good patient tolerance to progressive dynamic balance/stability exercises  
  
[]  See Progress Note/Recertification Patient will continue to benefit from skilled PT services to modify and progress therapeutic interventions, address functional mobility deficits, address ROM deficits, address strength deficits, analyze and address soft tissue restrictions, analyze and cue movement patterns, analyze and modify body mechanics/ergonomics and assess and modify postural abnormalities to attain remaining goals. Progress toward goals / Updated goals: 
Progressing towards LTG3 PLAN [x]  Upgrade activities as tolerated YES Continue plan of care  
[]  Discharge due to :   
[]  Other:   
 
Therapist: Demetria Cota, PT, DPT, MTC, CMTPT Date: 1/4/2019 Time: 6:45 PM  
 
Future Appointments Date Time Provider Christian Carver 1/8/2019  9:30 AM Vandana Tinsley, PT [place] [id number]  
1/11/2019 10:00 AM Greg Stover [address] [place] [address] Drive

## 2019-01-11 ENCOUNTER — HOSPITAL ENCOUNTER (OUTPATIENT)
Dept: PHYSICAL THERAPY | Age: 39
Discharge: HOME | End: 2019-01-11
Payer: COMMERCIAL

## 2019-01-11 PROCEDURE — 97110 THERAPEUTIC EXERCISES: CPT

## 2019-01-11 NOTE — PROGRESS NOTES
PHYSICAL THERAPY - DAILY TREATMENT NOTE Patient Name: Demetrius Rueda        Date: 2019 : 1980   yes Patient  Verified Visit #:   15      20  Insurance: Payor: Esme Candelario / Plan: [facility] PPO / Product Type: Commerical / In time: 10:00A Out time: 11:05A Total Treatment Time: 60 Medicare/ [facility] Time Tracking (below) Total Timed Codes (min):  NA 1:1 Treatment Time:  NA  
TREATMENT AREA =  Right ankle pain [M25.571] SUBJECTIVE Pain Level (on 0 to 10 scale):  4  / 10 Medication Changes/New allergies or changes in medical history, any new surgeries or procedures?    no  If yes, update Summary List  
Subjective Functional Status/Changes:  []  No changes reported \"I had to run through the air port yesterday to make my connecting flight, it didn't hurt but I was sore afterwards. \" OBJECTIVE Modalities Rationale:     decrease inflammation and decrease pain to improve patient's ability to perform unlimited ADLs  
 min [] Estim, type/location:   
                                 []  att     []  unatt     []  w/US     []  w/ice    []  w/heat 
 min []  Mechanical Traction: type/lbs   
               []  pro   []  sup   []  int   []  cont    []  before manual    []  after manual  
 min []  Ultrasound, settings/location:    
 min []  Iontophoresis w/ dexamethasone, location:   
                                           []  take home patch       []  in clinic  
10 min [x]  Ice     []  Heat    location/position: R ankle in long sit   
 min []  Vasopneumatic Device, press/temp:   
 min []  Other:   
[] Skin assessment post-treatment (if applicable):   
[]  intact    []  redness- no adverse reaction    
[]redness  adverse reaction:     
50 min Therapeutic Exercise:  [x]  See flow sheet Rationale:      increase ROM, increase strength, improve coordination, improve balance and increase proprioception to improve the patients ability to perform unlimited ADLs and gait  
 min Patient Education:  yes  Reviewed HEP []  Progressed/Changed HEP based on: Other Objective/Functional Measures: Added braiding walking Performed SLS on foam with EO Post Treatment Pain Level (on 0 to 10) scale:   4  / 10 ASSESSMENT Assessment/Changes in Function:  
 
Patient is demonstrating improved ankle stability however continues to have quick fatigue during single limb exercises. []  See Progress Note/Recertification Patient will continue to benefit from skilled PT services to modify and progress therapeutic interventions, address functional mobility deficits, address ROM deficits, address strength deficits, analyze and address soft tissue restrictions, analyze and cue movement patterns, analyze and modify body mechanics/ergonomics, assess and modify postural abnormalities, address imbalance/dizziness and instruct in home and community integration to attain remaining goals. Progress toward goals / Updated goals: 
Progressing towards LTG 2. PLAN [x]  Upgrade activities as tolerated yes Continue plan of care  
[]  Discharge due to :   
[]  Other:   
 
Therapist: Magdaleno Mayfield PT, DPT Date: 1/11/2019 Time: 12:44 PM  
 
Future Appointments Date Time Provider Christian Carver 1/15/2019  8:30 AM Truman Brandon PT [facility] [place] [place]  
1/18/2019 10:00 AM [facility]  
1/25/2019  1:30 PM Truman Brandon PT [facility] [place] [place]  
2/4/2019  9:30 AM [facility]  
2/8/2019  8:30 AM Truman Brandon PT [facility] [place] [place]

## 2019-01-15 ENCOUNTER — APPOINTMENT (OUTPATIENT)
Dept: PHYSICAL THERAPY | Age: 39
End: 2019-01-15
Payer: COMMERCIAL

## 2019-01-18 ENCOUNTER — APPOINTMENT (OUTPATIENT)
Dept: PHYSICAL THERAPY | Age: 39
End: 2019-01-18
Payer: COMMERCIAL

## 2019-02-04 ENCOUNTER — HOSPITAL ENCOUNTER (OUTPATIENT)
Dept: PHYSICAL THERAPY | Age: 39
End: 2019-02-04

## 2019-02-08 ENCOUNTER — APPOINTMENT (OUTPATIENT)
Dept: PHYSICAL THERAPY | Age: 39
End: 2019-02-08

## 2019-03-25 NOTE — PROGRESS NOTES
Bethany [address]z [address]  [facility] [[email or web address]]OR PHYSICAL THERAPY AT [address] [place], [place] [address]bud [facility], [address] ,[address], [address]  Phone: [phone]  Fax: [phone] DISCHARGE SUMMARY Patient Name: Lucinda Guajardo : 1980 Treatment/Medical Diagnosis: Right ankle pain [M25.571] Referral Source: Nawaf Rebolledo MD    
Date of Initial Visit: 18 Attended Visits: 15 Missed Visits: 1 SUMMARY OF TREATMENT Therapeutic exercises including ROM, strengthening and stretching; gait and balance training, postural re-education, modalities: CP, HEP instruction. CURRENT STATUS Patient is a 40 y.o. female who presents to [facility]  post tib T repair on 18. Mrs. Yves Hernandez called to cancel all future appointments on 19. She injured her L knee and was placed in an immobilizer. Current status is unknown. At her last attended visit on 19 she reported 4/10 pain, stating \"I had to run through the air port yesterday to make my connecting flight, it didn't hurt but I was sore afterwards. \" She was progressing well through therex, demonstrating improved ankle stability, however continued to quickly fatigue during single limb exercises.  
 
RECOMMENDATIONS Hold therapy and await physician's recommendations. Please advise. If you have any questions/comments please contact us directly at [phone]. Thank you for allowing us to assist in the care of your patient. Therapist Signature: Rivera Pa PT, DPT, MTC, CMTPT Date: 3/25/2019   Time: 8:49 AM